# Patient Record
Sex: FEMALE | Race: WHITE | NOT HISPANIC OR LATINO | Employment: PART TIME | URBAN - METROPOLITAN AREA
[De-identification: names, ages, dates, MRNs, and addresses within clinical notes are randomized per-mention and may not be internally consistent; named-entity substitution may affect disease eponyms.]

---

## 2023-02-28 LAB
AMPHETAMINE (PRESENCE) IN URINE BY SCREEN METHOD: ABNORMAL
BARBITURATES PRESENCE IN URINE BY SCREEN METHOD: ABNORMAL
BENZODIAZEPINE (PRESENCE) IN URINE BY SCREEN METHOD: ABNORMAL
CANNABINOIDS IN URINE BY SCREEN METHOD: ABNORMAL
COCAINE (PRESENCE) IN URINE BY SCREEN METHOD: ABNORMAL
DRUG SCREEN COMMENT URINE: ABNORMAL
FENTANYL URINE: ABNORMAL
METHADONE (PRESENCE) IN URINE BY SCREEN METHOD: ABNORMAL
OPIATES (PRESENCE) IN URINE BY SCREEN METHOD: ABNORMAL
OXYCODONE (PRESENCE) IN URINE BY SCREEN METHOD: ABNORMAL
PHENCYCLIDINE (PRESENCE) IN URINE BY SCREEN METHOD: ABNORMAL

## 2023-03-05 LAB
AMPHETAMINES,URINE: 2408 NG/ML
MDA,URINE: <200 NG/ML
MDEA,URINE: <200 NG/ML
MDMA,UR: <200 NG/ML
METHAMPHETAMINE QUANTITATIVE URINE: <200 NG/ML
PHENTERMINE,UR: <200 NG/ML

## 2023-10-30 PROBLEM — G47.01 INSOMNIA DUE TO MEDICAL CONDITION: Status: ACTIVE | Noted: 2023-10-30

## 2023-10-30 PROBLEM — F98.8 ADD (ATTENTION DEFICIT DISORDER): Status: ACTIVE | Noted: 2023-10-30

## 2023-10-30 PROBLEM — G93.40 ENCEPHALOPATHY: Status: ACTIVE | Noted: 2023-10-30

## 2023-10-30 RX ORDER — CYANOCOBALAMIN 1000 UG/ML
INJECTION, SOLUTION INTRAMUSCULAR; SUBCUTANEOUS
COMMUNITY
Start: 2016-09-02

## 2023-10-30 RX ORDER — ATENOLOL 25 MG/1
TABLET ORAL
COMMUNITY
Start: 2019-06-19

## 2023-10-30 RX ORDER — SYRINGE W-NEEDLE,DISPOSAB,3 ML 25GX5/8"
SYRINGE, EMPTY DISPOSABLE MISCELLANEOUS
COMMUNITY

## 2023-10-30 RX ORDER — THYROID, PORCINE 180 MG/1
TABLET ORAL
COMMUNITY
Start: 2021-03-11

## 2023-10-30 RX ORDER — DEXMETHYLPHENIDATE HYDROCHLORIDE 10 MG/1
TABLET ORAL
COMMUNITY
Start: 2023-03-01 | End: 2023-10-31

## 2023-10-30 RX ORDER — DEXTROAMPHETAMINE SACCHARATE, AMPHETAMINE ASPARTATE, DEXTROAMPHETAMINE SULFATE AND AMPHETAMINE SULFATE 5; 5; 5; 5 MG/1; MG/1; MG/1; MG/1
1 TABLET ORAL 2 TIMES DAILY
COMMUNITY
Start: 2022-03-01 | End: 2023-10-31

## 2023-10-30 RX ORDER — ESTRADIOL 0.5 MG/1
TABLET ORAL
COMMUNITY
Start: 2017-01-03

## 2023-10-30 RX ORDER — HYDROCHLOROTHIAZIDE 25 MG/1
TABLET ORAL
COMMUNITY
Start: 2016-07-29

## 2023-10-30 RX ORDER — LOSARTAN POTASSIUM 50 MG/1
TABLET ORAL
COMMUNITY
Start: 2016-07-29

## 2023-10-31 ENCOUNTER — OFFICE VISIT (OUTPATIENT)
Dept: NEUROLOGY | Facility: CLINIC | Age: 68
End: 2023-10-31
Payer: MEDICARE

## 2023-10-31 VITALS
WEIGHT: 198 LBS | DIASTOLIC BLOOD PRESSURE: 82 MMHG | HEART RATE: 99 BPM | BODY MASS INDEX: 31.08 KG/M2 | HEIGHT: 67 IN | SYSTOLIC BLOOD PRESSURE: 179 MMHG

## 2023-10-31 DIAGNOSIS — F98.8 ADD (ATTENTION DEFICIT DISORDER) WITHOUT HYPERACTIVITY: ICD-10-CM

## 2023-10-31 DIAGNOSIS — G93.40 ENCEPHALOPATHY: Primary | ICD-10-CM

## 2023-10-31 DIAGNOSIS — G47.01 INSOMNIA DUE TO MEDICAL CONDITION: ICD-10-CM

## 2023-10-31 PROCEDURE — 99214 OFFICE O/P EST MOD 30 MIN: CPT | Performed by: PSYCHIATRY & NEUROLOGY

## 2023-10-31 PROCEDURE — 1159F MED LIST DOCD IN RCRD: CPT | Performed by: PSYCHIATRY & NEUROLOGY

## 2023-10-31 PROCEDURE — 1036F TOBACCO NON-USER: CPT | Performed by: PSYCHIATRY & NEUROLOGY

## 2023-10-31 RX ORDER — ERGOCALCIFEROL 1.25 MG/1
1 CAPSULE ORAL
COMMUNITY

## 2023-10-31 RX ORDER — DEXTROAMPHETAMINE SACCHARATE, AMPHETAMINE ASPARTATE, DEXTROAMPHETAMINE SULFATE AND AMPHETAMINE SULFATE 2.5; 2.5; 2.5; 2.5 MG/1; MG/1; MG/1; MG/1
10 TABLET ORAL 2 TIMES DAILY
Qty: 60 TABLET | Refills: 0 | Status: SHIPPED | OUTPATIENT
Start: 2023-12-30 | End: 2024-02-13 | Stop reason: SDUPTHER

## 2023-10-31 RX ORDER — DEXTROAMPHETAMINE SACCHARATE, AMPHETAMINE ASPARTATE, DEXTROAMPHETAMINE SULFATE AND AMPHETAMINE SULFATE 2.5; 2.5; 2.5; 2.5 MG/1; MG/1; MG/1; MG/1
10 TABLET ORAL 2 TIMES DAILY
Qty: 60 TABLET | Refills: 0 | Status: SHIPPED | OUTPATIENT
Start: 2023-11-30 | End: 2024-02-13 | Stop reason: SDUPTHER

## 2023-10-31 RX ORDER — FERROUS SULFATE 325(65) MG
325 TABLET ORAL
COMMUNITY

## 2023-10-31 RX ORDER — DEXTROAMPHETAMINE SACCHARATE, AMPHETAMINE ASPARTATE, DEXTROAMPHETAMINE SULFATE AND AMPHETAMINE SULFATE 2.5; 2.5; 2.5; 2.5 MG/1; MG/1; MG/1; MG/1
10 TABLET ORAL 2 TIMES DAILY
Qty: 60 TABLET | Refills: 0 | Status: SHIPPED | OUTPATIENT
Start: 2023-10-31 | End: 2024-02-13 | Stop reason: SDUPTHER

## 2023-10-31 RX ORDER — DOXYCYCLINE 100 MG/1
100 CAPSULE ORAL DAILY PRN
COMMUNITY
Start: 2023-09-26

## 2023-10-31 ASSESSMENT — ENCOUNTER SYMPTOMS
SHORTNESS OF BREATH: 0
PALPITATIONS: 0
ARTHRALGIAS: 0
DIZZINESS: 0
HALLUCINATIONS: 0
LIGHT-HEADEDNESS: 0
TREMORS: 0
NAUSEA: 0
UNEXPECTED WEIGHT CHANGE: 0
HEADACHES: 0
COUGH: 0
SPEECH DIFFICULTY: 0
BRUISES/BLEEDS EASILY: 0
FEVER: 0
ABDOMINAL PAIN: 0
EYE PAIN: 0
NECK STIFFNESS: 0
FATIGUE: 0
NECK PAIN: 0
TROUBLE SWALLOWING: 0
SLEEP DISTURBANCE: 0
SEIZURES: 0
VOMITING: 0
WHEEZING: 0
JOINT SWELLING: 0
FACIAL ASYMMETRY: 0
BACK PAIN: 0
SINUS PRESSURE: 0
ADENOPATHY: 0
FREQUENCY: 0
NUMBNESS: 0
PHOTOPHOBIA: 0
WEAKNESS: 0
DIFFICULTY URINATING: 0
AGITATION: 0
CONFUSION: 0
HYPERACTIVE: 0

## 2023-10-31 ASSESSMENT — PATIENT HEALTH QUESTIONNAIRE - PHQ9
1. LITTLE INTEREST OR PLEASURE IN DOING THINGS: NOT AT ALL
SUM OF ALL RESPONSES TO PHQ9 QUESTIONS 1 & 2: 0
2. FEELING DOWN, DEPRESSED OR HOPELESS: NOT AT ALL

## 2023-10-31 NOTE — PROGRESS NOTES
Ashley Ozuna  68 y.o.       SUBJECTIVE    ADHD  Pertinent negatives include no abdominal pain, arthralgias, chest pain, coughing, fatigue, fever, headaches, joint swelling, nausea, neck pain, numbness, rash, vomiting or weakness.    Ashley Ozuna 68-year-old young lady was seen today for follow-up of her encephalopathy due to attention deficit disorder difficulty with work and home.  Since last seen she has been on multiple medication and because of the shortage of Adderall she was put on Focalin which she does not like.  Today her physical and neurological examination was normal.  I would like to switch her back to Adderall 10 mg twice a day and have discussed the cardiovascular side effects including sudden death heart attack and stroke which she understood and have discussed controlled substance policy, abusive potential, risk benefit and the precautions to be taken and depending on how she does I might make future recommendation when she comes back to see me in 3 months.    I did review the medication list    Due to technical limitations of voice recognition and human error, this note may not accurately reflect the care of the patient.    Review of Systems   Constitutional:  Negative for fatigue, fever and unexpected weight change.   HENT:  Negative for dental problem, ear pain, hearing loss, sinus pressure, tinnitus and trouble swallowing.    Eyes:  Negative for photophobia, pain and visual disturbance.   Respiratory:  Negative for cough, shortness of breath and wheezing.    Cardiovascular:  Negative for chest pain, palpitations and leg swelling.   Gastrointestinal:  Negative for abdominal pain, nausea and vomiting.   Genitourinary:  Negative for difficulty urinating, enuresis and frequency.   Musculoskeletal:  Negative for arthralgias, back pain, joint swelling, neck pain and neck stiffness.   Skin:  Negative for pallor and rash.   Allergic/Immunologic: Negative for food allergies.   Neurological:  Negative  "for dizziness, tremors, seizures, syncope, facial asymmetry, speech difficulty, weakness, light-headedness, numbness and headaches.   Hematological:  Negative for adenopathy. Does not bruise/bleed easily.   Psychiatric/Behavioral:  Negative for agitation, behavioral problems, confusion, hallucinations and sleep disturbance. The patient is not hyperactive.         Patient Active Problem List   Diagnosis    ADD (attention deficit disorder)    Encephalopathy    Insomnia due to medical condition     Past Medical History:   Diagnosis Date    Personal history of other mental and behavioral disorders 01/10/2017    History of attention deficit hyperactivity disorder (ADHD)     No past surgical history on file.    reports that she has never smoked. She has never used smokeless tobacco. Alcohol use questions deferred to the physician. Drug use questions deferred to the physician.    /82   Pulse 99   Ht 1.689 m (5' 6.5\")   Wt 89.8 kg (198 lb)   BMI 31.48 kg/m²     OBJECTIVE  Physical Exam/Neurological Exam   Constitutional: General appearance: no acute distress   Auscultation of Heart: Regular rate and rhythm, no murmurs, normal S1 and S2.   Carotid Arteries: Intact without any bruits.   Neck is supple.   No lymph adenopathy.   Peripheral Vascular Exam: Pulses +2 and equal in all extremities. No swelling, varicosities, edema or tenderness to palpations.    Abdomen is soft, nondistended. No organomegaly.  Mental status: The patient was in no distress, alert, interactive and cooperative. Affect is appropriate.   Orientation: oriented to person, oriented to place and oriented to time.   Memory: recent memory intact and remote memory intact.   Attention: normal attention span and normal concentrating ability.   Language: normal comprehension and no difficulty naming common objects.   Fund of knowledge: Patient displays adequate knowledge of current events, adequate fund of knowledge regarding past history and adequate " fund of knowledge regarding vocabulary.   Eyes: The ophthalmoscopic examination was normal. The fundi are visualized with normal disc margins and without.  Cranial nerve II: Visual fields full to confrontation.   Cranial nerves III, IV, and VI: Pupils round, equally reactive to light; no ptosis. EOMs intact. No nystagmus.   Cranial Nerve V: Facial sensation intact bilaterally.   Cranial nerve VII: Normal and symmetric facial strength.   Cranial nerve VIII: Hearing is intact bilaterally to finger rub / whisper.   Cranial nerves IX and X: Palate elevates symmetrically.   Cranial nerve XI: Shoulder shrug and neck rotation strength are intact.   Cranial nerve XII: Tongue midline with normal strength.   Motor: Motor exam was normal. Muscle bulk was normal in both upper and lower extremities. Muscle tone was normal in both upper and lower extremities. Muscle strength was 5/5 throughout. no abnormal or adventitious movements were present.   Deep Tendon Reflexes: left biceps 2+ , right biceps 2+, left triceps 2+, right triceps 2+, left brachioradialis 2+, right brachioradialis 2+, left patella 2+, right patella 2+, left ankle jerk 2+, right ankle jerk 2+   Plantar Reflex: Toes downgoing to plantar stimulation on the left. Toes downgoing to plantar stimulation on the right.   Sensory Exam: Normal to light touch.   Coordination: There is no limb dystaxia and rapid alternating movements are intact.  Gait: Gait is normal without spasticity, ataxia or bradykinesia. Stance is stable with a negative Romberg.      ASSESSMENT/PLAN  Diagnoses and all orders for this visit:  Encephalopathy  ADD (attention deficit disorder) without hyperactivity  -     amphetamine-dextroamphetamine (Adderall) 10 mg tablet; Take 1 tablet (10 mg) by mouth 2 times a day.  -     amphetamine-dextroamphetamine (Adderall) 10 mg tablet; Take 1 tablet (10 mg) by mouth 2 times a day. Do not start before November 30, 2023.  -     amphetamine-dextroamphetamine  (Adderall) 10 mg tablet; Take 1 tablet (10 mg) by mouth 2 times a day. Do not start before December 30, 2023.  Insomnia due to medical condition        Marc Johnson MD  10/31/2023  1:43 PM

## 2024-02-06 PROBLEM — D50.9 IRON DEFICIENCY ANEMIA, UNSPECIFIED: Status: ACTIVE | Noted: 2024-02-06

## 2024-02-06 PROBLEM — E55.9 VITAMIN D DEFICIENCY DISEASE: Status: ACTIVE | Noted: 2024-02-06

## 2024-02-13 ENCOUNTER — OFFICE VISIT (OUTPATIENT)
Dept: NEUROLOGY | Facility: CLINIC | Age: 69
End: 2024-02-13
Payer: MEDICARE

## 2024-02-13 VITALS
DIASTOLIC BLOOD PRESSURE: 80 MMHG | WEIGHT: 202.8 LBS | HEART RATE: 92 BPM | HEIGHT: 61 IN | SYSTOLIC BLOOD PRESSURE: 124 MMHG | BODY MASS INDEX: 38.29 KG/M2 | RESPIRATION RATE: 14 BRPM

## 2024-02-13 DIAGNOSIS — G47.01 INSOMNIA DUE TO MEDICAL CONDITION: ICD-10-CM

## 2024-02-13 DIAGNOSIS — F98.8 ADD (ATTENTION DEFICIT DISORDER) WITHOUT HYPERACTIVITY: ICD-10-CM

## 2024-02-13 DIAGNOSIS — G93.40 ENCEPHALOPATHY: Primary | ICD-10-CM

## 2024-02-13 DIAGNOSIS — F90.9 ATTENTION DEFICIT HYPERACTIVITY DISORDER (ADHD), UNSPECIFIED ADHD TYPE: ICD-10-CM

## 2024-02-13 DIAGNOSIS — F90.2 ATTENTION DEFICIT HYPERACTIVITY DISORDER (ADHD), COMBINED TYPE: ICD-10-CM

## 2024-02-13 DIAGNOSIS — F55.8 ABUSE OF OTHER NON-PSYCHOACTIVE SUBSTANCES: ICD-10-CM

## 2024-02-13 LAB
AMPHETAMINES UR QL SCN: ABNORMAL
BARBITURATES UR QL SCN: ABNORMAL
BENZODIAZ UR QL SCN: ABNORMAL
BZE UR QL SCN: ABNORMAL
CANNABINOIDS UR QL SCN: ABNORMAL
FENTANYL+NORFENTANYL UR QL SCN: ABNORMAL
OPIATES UR QL SCN: ABNORMAL
OXYCODONE+OXYMORPHONE UR QL SCN: ABNORMAL
PCP UR QL SCN: ABNORMAL

## 2024-02-13 PROCEDURE — 1159F MED LIST DOCD IN RCRD: CPT | Performed by: PSYCHIATRY & NEUROLOGY

## 2024-02-13 PROCEDURE — 99214 OFFICE O/P EST MOD 30 MIN: CPT | Performed by: PSYCHIATRY & NEUROLOGY

## 2024-02-13 PROCEDURE — 80307 DRUG TEST PRSMV CHEM ANLYZR: CPT

## 2024-02-13 PROCEDURE — 1036F TOBACCO NON-USER: CPT | Performed by: PSYCHIATRY & NEUROLOGY

## 2024-02-13 PROCEDURE — 80324 DRUG SCREEN AMPHETAMINES 1/2: CPT

## 2024-02-13 PROCEDURE — 3074F SYST BP LT 130 MM HG: CPT | Performed by: PSYCHIATRY & NEUROLOGY

## 2024-02-13 PROCEDURE — 3079F DIAST BP 80-89 MM HG: CPT | Performed by: PSYCHIATRY & NEUROLOGY

## 2024-02-13 RX ORDER — DEXTROAMPHETAMINE SACCHARATE, AMPHETAMINE ASPARTATE, DEXTROAMPHETAMINE SULFATE AND AMPHETAMINE SULFATE 2.5; 2.5; 2.5; 2.5 MG/1; MG/1; MG/1; MG/1
10 TABLET ORAL 2 TIMES DAILY
Qty: 60 TABLET | Refills: 0 | Status: SHIPPED | OUTPATIENT
Start: 2024-03-13 | End: 2024-03-20 | Stop reason: SDUPTHER

## 2024-02-13 RX ORDER — DEXTROAMPHETAMINE SACCHARATE, AMPHETAMINE ASPARTATE, DEXTROAMPHETAMINE SULFATE AND AMPHETAMINE SULFATE 2.5; 2.5; 2.5; 2.5 MG/1; MG/1; MG/1; MG/1
10 TABLET ORAL 2 TIMES DAILY
Qty: 60 TABLET | Refills: 0 | Status: SHIPPED | OUTPATIENT
Start: 2024-04-12 | End: 2024-03-20 | Stop reason: SDUPTHER

## 2024-02-13 RX ORDER — DEXTROAMPHETAMINE SACCHARATE, AMPHETAMINE ASPARTATE, DEXTROAMPHETAMINE SULFATE AND AMPHETAMINE SULFATE 2.5; 2.5; 2.5; 2.5 MG/1; MG/1; MG/1; MG/1
10 TABLET ORAL 2 TIMES DAILY
Qty: 60 TABLET | Refills: 0 | Status: SHIPPED | OUTPATIENT
Start: 2024-02-13 | End: 2024-05-14 | Stop reason: SDUPTHER

## 2024-02-13 ASSESSMENT — ENCOUNTER SYMPTOMS
VOMITING: 0
ADENOPATHY: 0
NECK STIFFNESS: 0
SINUS PRESSURE: 0
FEVER: 0
LIGHT-HEADEDNESS: 0
NUMBNESS: 0
WEAKNESS: 0
PHOTOPHOBIA: 0
UNEXPECTED WEIGHT CHANGE: 0
NECK PAIN: 0
SLEEP DISTURBANCE: 0
SEIZURES: 0
ABDOMINAL PAIN: 0
TROUBLE SWALLOWING: 0
JOINT SWELLING: 0
AGITATION: 0
CONFUSION: 0
EYE PAIN: 0
DIZZINESS: 0
NAUSEA: 0
PALPITATIONS: 0
HEADACHES: 0
FREQUENCY: 0
FACIAL ASYMMETRY: 0
SPEECH DIFFICULTY: 0
HALLUCINATIONS: 0
SHORTNESS OF BREATH: 0
FATIGUE: 0
BACK PAIN: 0
HYPERACTIVE: 0
BRUISES/BLEEDS EASILY: 0
DIFFICULTY URINATING: 0
WHEEZING: 0
COUGH: 0
ARTHRALGIAS: 0
TREMORS: 0

## 2024-02-13 ASSESSMENT — PATIENT HEALTH QUESTIONNAIRE - PHQ9
SUM OF ALL RESPONSES TO PHQ9 QUESTIONS 1 & 2: 0
1. LITTLE INTEREST OR PLEASURE IN DOING THINGS: NOT AT ALL
2. FEELING DOWN, DEPRESSED OR HOPELESS: NOT AT ALL

## 2024-02-13 NOTE — PROGRESS NOTES
Ashley PINEDA Laith  68 y.o.       SUBJECTIVE    HPI   Ashley 68-year-old young lady who was seen today for follow-up of her encephalopathy due to attention deficit disorder with difficulty at work at home.  Since last seen she has done very well on Adderall 10 or twice a day and continue a big difference when she is on medicine compared to off medicine.  No side effects from the medication.  Today her physical and neurological examination was normal.  Would like to continue her Adderall the way she is taking and have discussed the controlled substance policy, abuse potential, risk benefit and the precautions to be taken and depending on how she does I might make future recommendation when she comes back to see me in 3 months.    I did review the medication list.  Para  Due to technical limitations of voice recognition and human error, this note may not accurately reflect the care of the patient.    Review of Systems   Constitutional:  Negative for fatigue, fever and unexpected weight change.   HENT:  Negative for dental problem, ear pain, hearing loss, sinus pressure, tinnitus and trouble swallowing.    Eyes:  Negative for photophobia, pain and visual disturbance.   Respiratory:  Negative for cough, shortness of breath and wheezing.    Cardiovascular:  Negative for chest pain, palpitations and leg swelling.   Gastrointestinal:  Negative for abdominal pain, nausea and vomiting.   Genitourinary:  Negative for difficulty urinating, enuresis and frequency.   Musculoskeletal:  Negative for arthralgias, back pain, joint swelling, neck pain and neck stiffness.   Skin:  Negative for pallor and rash.   Allergic/Immunologic: Negative for food allergies.   Neurological:  Negative for dizziness, tremors, seizures, syncope, facial asymmetry, speech difficulty, weakness, light-headedness, numbness and headaches.   Hematological:  Negative for adenopathy. Does not bruise/bleed easily.   Psychiatric/Behavioral:  Negative for  "agitation, behavioral problems, confusion, hallucinations and sleep disturbance. The patient is not hyperactive.         Patient Active Problem List   Diagnosis    ADD (attention deficit disorder)    Encephalopathy    Insomnia due to medical condition    Anemia    HTN (hypertension)    Hypertriglyceridemia    Hypothyroid    Iron deficiency anemia, unspecified    Vitamin D deficiency disease     Past Medical History:   Diagnosis Date    Personal history of other mental and behavioral disorders 01/10/2017    History of attention deficit hyperactivity disorder (ADHD)     History reviewed. No pertinent surgical history.    reports that she has quit smoking. Her smoking use included cigarettes. She has never used smokeless tobacco. She reports that she does not drink alcohol and does not use drugs.    /80 (BP Location: Left arm, Patient Position: Sitting, BP Cuff Size: Large adult)   Pulse 92   Resp 14   Ht 1.539 m (5' 0.6\")   Wt 92 kg (202 lb 12.8 oz)   BMI 38.83 kg/m²     OBJECTIVE  Physical Exam/Neurological Exam   Constitutional: General appearance: no acute distress   Auscultation of Heart: Regular rate and rhythm, no murmurs, normal S1 and S2.   Carotid Arteries: Intact without any bruits.   Neck is supple.   No lymph adenopathy.   Peripheral Vascular Exam: Pulses +2 and equal in all extremities. No swelling, varicosities, edema or tenderness to palpations.    Abdomen is soft, nondistended. No organomegaly.  Mental status: The patient was in no distress, alert, interactive and cooperative. Affect is appropriate.   Orientation: oriented to person, oriented to place and oriented to time.   Memory: recent memory intact and remote memory intact.   Attention: normal attention span and normal concentrating ability.   Language: normal comprehension and no difficulty naming common objects.   Fund of knowledge: Patient displays adequate knowledge of current events, adequate fund of knowledge regarding past " history and adequate fund of knowledge regarding vocabulary.   Eyes: The ophthalmoscopic examination was normal. The fundi are visualized with normal disc margins and without.  Cranial nerve II: Visual fields full to confrontation.   Cranial nerves III, IV, and VI: Pupils round, equally reactive to light; no ptosis. EOMs intact. No nystagmus.   Cranial Nerve V: Facial sensation intact bilaterally.   Cranial nerve VII: Normal and symmetric facial strength.   Cranial nerve VIII: Hearing is intact bilaterally to finger rub / whisper.   Cranial nerves IX and X: Palate elevates symmetrically.   Cranial nerve XI: Shoulder shrug and neck rotation strength are intact.   Cranial nerve XII: Tongue midline with normal strength.   Motor: Motor exam was normal. Muscle bulk was normal in both upper and lower extremities. Muscle tone was normal in both upper and lower extremities. Muscle strength was 5/5 throughout. no abnormal or adventitious movements were present.   Deep Tendon Reflexes: left biceps 2+ , right biceps 2+, left triceps 2+, right triceps 2+, left brachioradialis 2+, right brachioradialis 2+, left patella 2+, right patella 2+, left ankle jerk 2+, right ankle jerk 2+   Plantar Reflex: Toes downgoing to plantar stimulation on the left. Toes downgoing to plantar stimulation on the right.   Sensory Exam: Normal to light touch.   Coordination: There is no limb dystaxia and rapid alternating movements are intact.  Gait: Gait is normal without spasticity, ataxia or bradykinesia. Stance is stable with a negative Romberg.      ASSESSMENT/PLAN  Diagnoses and all orders for this visit:  Encephalopathy  Attention deficit hyperactivity disorder (ADHD), unspecified ADHD type  -     Drug Screen, Urine With Reflex to Confirmation; Future  Abuse of other non-psychoactive substances  -     Drug Screen, Urine With Reflex to Confirmation; Future  ADD (attention deficit disorder) without hyperactivity  -      amphetamine-dextroamphetamine (Adderall) 10 mg tablet; Take 1 tablet (10 mg) by mouth 2 times a day.  -     amphetamine-dextroamphetamine (Adderall) 10 mg tablet; Take 1 tablet (10 mg) by mouth 2 times a day. Do not start before March 13, 2024.  -     amphetamine-dextroamphetamine (Adderall) 10 mg tablet; Take 1 tablet (10 mg) by mouth 2 times a day. Do not start before April 12, 2024.  Insomnia due to medical condition        Marc Johnson MD  2/13/2024  1:46 PM

## 2024-02-17 LAB
AMPHET UR-MCNC: 1526 NG/ML
MDA UR-MCNC: <200 NG/ML
MDEA UR-MCNC: <200 NG/ML
MDMA UR-MCNC: <200 NG/ML
METHAMPHET UR-MCNC: <200 NG/ML
PHENTERMINE UR CFM-MCNC: <200 NG/ML

## 2024-03-20 ENCOUNTER — TELEPHONE (OUTPATIENT)
Dept: NEUROLOGY | Facility: CLINIC | Age: 69
End: 2024-03-20
Payer: MEDICARE

## 2024-03-20 DIAGNOSIS — F98.8 ADD (ATTENTION DEFICIT DISORDER) WITHOUT HYPERACTIVITY: ICD-10-CM

## 2024-03-20 RX ORDER — DEXTROAMPHETAMINE SACCHARATE, AMPHETAMINE ASPARTATE, DEXTROAMPHETAMINE SULFATE AND AMPHETAMINE SULFATE 2.5; 2.5; 2.5; 2.5 MG/1; MG/1; MG/1; MG/1
10 TABLET ORAL 2 TIMES DAILY
Qty: 60 TABLET | Refills: 0 | Status: SHIPPED | OUTPATIENT
Start: 2024-04-12 | End: 2024-05-14 | Stop reason: SDUPTHER

## 2024-03-20 RX ORDER — DEXTROAMPHETAMINE SACCHARATE, AMPHETAMINE ASPARTATE, DEXTROAMPHETAMINE SULFATE AND AMPHETAMINE SULFATE 2.5; 2.5; 2.5; 2.5 MG/1; MG/1; MG/1; MG/1
10 TABLET ORAL 2 TIMES DAILY
Qty: 60 TABLET | Refills: 0 | Status: SHIPPED | OUTPATIENT
Start: 2024-03-20 | End: 2024-05-14 | Stop reason: SDUPTHER

## 2024-03-20 NOTE — TELEPHONE ENCOUNTER
Pt called asking for her Adderall 10 mg to be sent to [facility] in [place].  [facility] is out.  Thank you.

## 2024-05-14 ENCOUNTER — OFFICE VISIT (OUTPATIENT)
Dept: NEUROLOGY | Facility: CLINIC | Age: 69
End: 2024-05-14
Payer: MEDICARE

## 2024-05-14 VITALS
DIASTOLIC BLOOD PRESSURE: 87 MMHG | HEIGHT: 66 IN | BODY MASS INDEX: 31.57 KG/M2 | HEART RATE: 103 BPM | WEIGHT: 196.4 LBS | SYSTOLIC BLOOD PRESSURE: 142 MMHG

## 2024-05-14 DIAGNOSIS — G93.40 ENCEPHALOPATHY: Primary | ICD-10-CM

## 2024-05-14 DIAGNOSIS — F98.8 ADD (ATTENTION DEFICIT DISORDER) WITHOUT HYPERACTIVITY: ICD-10-CM

## 2024-05-14 DIAGNOSIS — G47.01 INSOMNIA DUE TO MEDICAL CONDITION: ICD-10-CM

## 2024-05-14 PROCEDURE — 99214 OFFICE O/P EST MOD 30 MIN: CPT | Performed by: PSYCHIATRY & NEUROLOGY

## 2024-05-14 PROCEDURE — 3079F DIAST BP 80-89 MM HG: CPT | Performed by: PSYCHIATRY & NEUROLOGY

## 2024-05-14 PROCEDURE — 1036F TOBACCO NON-USER: CPT | Performed by: PSYCHIATRY & NEUROLOGY

## 2024-05-14 PROCEDURE — 3077F SYST BP >= 140 MM HG: CPT | Performed by: PSYCHIATRY & NEUROLOGY

## 2024-05-14 PROCEDURE — 1159F MED LIST DOCD IN RCRD: CPT | Performed by: PSYCHIATRY & NEUROLOGY

## 2024-05-14 RX ORDER — DEXTROAMPHETAMINE SACCHARATE, AMPHETAMINE ASPARTATE, DEXTROAMPHETAMINE SULFATE AND AMPHETAMINE SULFATE 2.5; 2.5; 2.5; 2.5 MG/1; MG/1; MG/1; MG/1
10 TABLET ORAL 3 TIMES DAILY
Qty: 90 TABLET | Refills: 0 | Status: SHIPPED | OUTPATIENT
Start: 2024-06-13 | End: 2024-07-13

## 2024-05-14 RX ORDER — GUANFACINE 2 MG/1
2 TABLET, EXTENDED RELEASE ORAL DAILY
Qty: 30 TABLET | Refills: 3 | Status: SHIPPED | OUTPATIENT
Start: 2024-05-14

## 2024-05-14 RX ORDER — DEXTROAMPHETAMINE SACCHARATE, AMPHETAMINE ASPARTATE, DEXTROAMPHETAMINE SULFATE AND AMPHETAMINE SULFATE 2.5; 2.5; 2.5; 2.5 MG/1; MG/1; MG/1; MG/1
10 TABLET ORAL 3 TIMES DAILY
Qty: 90 TABLET | Refills: 0 | Status: SHIPPED | OUTPATIENT
Start: 2024-07-13 | End: 2024-08-12

## 2024-05-14 RX ORDER — DEXTROAMPHETAMINE SACCHARATE, AMPHETAMINE ASPARTATE, DEXTROAMPHETAMINE SULFATE AND AMPHETAMINE SULFATE 2.5; 2.5; 2.5; 2.5 MG/1; MG/1; MG/1; MG/1
10 TABLET ORAL 3 TIMES DAILY
Qty: 90 TABLET | Refills: 0 | Status: SHIPPED | OUTPATIENT
Start: 2024-05-14 | End: 2024-06-13

## 2024-05-14 ASSESSMENT — ENCOUNTER SYMPTOMS
HALLUCINATIONS: 0
FREQUENCY: 0
UNEXPECTED WEIGHT CHANGE: 0
EYE PAIN: 0
SHORTNESS OF BREATH: 0
JOINT SWELLING: 0
AGITATION: 0
FATIGUE: 0
ARTHRALGIAS: 0
HYPERACTIVE: 0
NAUSEA: 0
CONFUSION: 0
WHEEZING: 0
DIZZINESS: 0
DIFFICULTY URINATING: 0
FACIAL ASYMMETRY: 0
VOMITING: 0
SLEEP DISTURBANCE: 0
NUMBNESS: 0
WEAKNESS: 0
FEVER: 0
PHOTOPHOBIA: 0
HEADACHES: 0
ADENOPATHY: 0
SEIZURES: 0
TREMORS: 0
COUGH: 0
NECK PAIN: 0
SPEECH DIFFICULTY: 0
LIGHT-HEADEDNESS: 0
ABDOMINAL PAIN: 0
BACK PAIN: 0
TROUBLE SWALLOWING: 0
BRUISES/BLEEDS EASILY: 0
NECK STIFFNESS: 0
SINUS PRESSURE: 0
PALPITATIONS: 0

## 2024-05-14 ASSESSMENT — PATIENT HEALTH QUESTIONNAIRE - PHQ9
1. LITTLE INTEREST OR PLEASURE IN DOING THINGS: NOT AT ALL
2. FEELING DOWN, DEPRESSED OR HOPELESS: NOT AT ALL
SUM OF ALL RESPONSES TO PHQ9 QUESTIONS 1 & 2: 0

## 2024-05-14 NOTE — PROGRESS NOTES
Ashley PINEDA Laith  69 y.o.       SUBJECTIVE    HPI   Ashley is a 69-year-old young lady who was seen today for follow-up of her encephalopathy due to attention deficit disorder with difficulty at work and home.  Since last seen she is doing better but the medicine is not lasting long enough.  She was on Tenex in the past which did seem to have helped her.  Today her physical and neurological examination is nonfocal.  I would like to change her Adderall to 10 mg 3 times a day and add Intuniv 2 mg in the morning and have advised her to keep a blood pressure diary with her diet with a goal to keep it around 130/80 have discussed controlled substance policy, abuse of potential risk and the benefit including the cardiovascular and the neurological side effects which she understood and see her back in 3 months.    I did review the medication list.      Due to technical limitations of voice recognition and human error, this note may not accurately reflect the care of the patient.    Review of Systems   Constitutional:  Negative for fatigue, fever and unexpected weight change.   HENT:  Negative for dental problem, ear pain, hearing loss, sinus pressure, tinnitus and trouble swallowing.    Eyes:  Negative for photophobia, pain and visual disturbance.   Respiratory:  Negative for cough, shortness of breath and wheezing.    Cardiovascular:  Negative for chest pain, palpitations and leg swelling.   Gastrointestinal:  Negative for abdominal pain, nausea and vomiting.   Genitourinary:  Negative for difficulty urinating, enuresis and frequency.   Musculoskeletal:  Negative for arthralgias, back pain, joint swelling, neck pain and neck stiffness.   Skin:  Negative for pallor and rash.   Allergic/Immunologic: Negative for food allergies.   Neurological:  Negative for dizziness, tremors, seizures, syncope, facial asymmetry, speech difficulty, weakness, light-headedness, numbness and headaches.   Hematological:  Negative for  "adenopathy. Does not bruise/bleed easily.   Psychiatric/Behavioral:  Negative for agitation, behavioral problems, confusion, hallucinations and sleep disturbance. The patient is not hyperactive.         Patient Active Problem List   Diagnosis    ADD (attention deficit disorder)    Encephalopathy    Insomnia due to medical condition    Anemia    HTN (hypertension)    Hypertriglyceridemia    Hypothyroid    Iron deficiency anemia, unspecified    Vitamin D deficiency disease     Past Medical History:   Diagnosis Date    Personal history of other mental and behavioral disorders 01/10/2017    History of attention deficit hyperactivity disorder (ADHD)     No past surgical history on file.    reports that she has quit smoking. Her smoking use included cigarettes. She has never used smokeless tobacco. She reports that she does not drink alcohol and does not use drugs.    /87   Pulse 103   Ht 1.676 m (5' 6\")   Wt 89.1 kg (196 lb 6.4 oz)   BMI 31.70 kg/m²     OBJECTIVE  Physical Exam/Neurological Exam   Constitutional: General appearance: no acute distress   Auscultation of Heart: Regular rate and rhythm, no murmurs, normal S1 and S2.   Carotid Arteries: Intact without any bruits.   Neck is supple.   No lymph adenopathy.   Peripheral Vascular Exam: Pulses +2 and equal in all extremities. No swelling, varicosities, edema or tenderness to palpations.    Abdomen is soft, nondistended. No organomegaly.  Mental status: The patient was in no distress, alert, interactive and cooperative. Affect is appropriate.   Orientation: oriented to person, oriented to place and oriented to time.   Memory: recent memory intact and remote memory intact.   Attention: normal attention span and normal concentrating ability.   Language: normal comprehension and no difficulty naming common objects.   Fund of knowledge: Patient displays adequate knowledge of current events, adequate fund of knowledge regarding past history and adequate fund of " knowledge regarding vocabulary.   Eyes: The ophthalmoscopic examination was normal. The fundi are visualized with normal disc margins and without.  Cranial nerve II: Visual fields full to confrontation.   Cranial nerves III, IV, and VI: Pupils round, equally reactive to light; no ptosis. EOMs intact. No nystagmus.   Cranial Nerve V: Facial sensation intact bilaterally.   Cranial nerve VII: Normal and symmetric facial strength.   Cranial nerve VIII: Hearing is intact bilaterally to finger rub / whisper.   Cranial nerves IX and X: Palate elevates symmetrically.   Cranial nerve XI: Shoulder shrug and neck rotation strength are intact.   Cranial nerve XII: Tongue midline with normal strength.   Motor: Motor exam was normal. Muscle bulk was normal in both upper and lower extremities. Muscle tone was normal in both upper and lower extremities. Muscle strength was 5/5 throughout. no abnormal or adventitious movements were present.   Deep Tendon Reflexes: left biceps 2+ , right biceps 2+, left triceps 2+, right triceps 2+, left brachioradialis 2+, right brachioradialis 2+, left patella 2+, right patella 2+, left ankle jerk 2+, right ankle jerk 2+   Plantar Reflex: Toes downgoing to plantar stimulation on the left. Toes downgoing to plantar stimulation on the right.   Sensory Exam: Normal to light touch.   Coordination: There is no limb dystaxia and rapid alternating movements are intact.  Gait: Gait is normal without spasticity, ataxia or bradykinesia. Stance is stable with a negative Romberg.      ASSESSMENT/PLAN  Diagnoses and all orders for this visit:  Encephalopathy  ADD (attention deficit disorder) without hyperactivity  -     amphetamine-dextroamphetamine (Adderall) 10 mg tablet; Take 1 tablet (10 mg) by mouth 3 times a day.  -     amphetamine-dextroamphetamine (Adderall) 10 mg tablet; Take 1 tablet (10 mg) by mouth 3 times a day. Do not fill before June 13, 2024.  -     amphetamine-dextroamphetamine (Adderall) 10 mg  tablet; Take 1 tablet (10 mg) by mouth 3 times a day. Do not fill before July 13, 2024.  -     guanFACINE (Intuniv) 2 mg 24 hr tablet; Take 1 tablet (2 mg) by mouth once daily.  Insomnia due to medical condition        Marc Johnson MD  5/14/2024  1:56 PM

## 2024-08-01 RX ORDER — IRON POLYSACCHARIDE COMPLEX 180 MG
1 CAPSULE ORAL DAILY
COMMUNITY
Start: 2023-11-07

## 2024-08-06 ENCOUNTER — APPOINTMENT (OUTPATIENT)
Dept: NEUROLOGY | Facility: CLINIC | Age: 69
End: 2024-08-06
Payer: MEDICARE

## 2024-08-06 VITALS
HEIGHT: 66 IN | DIASTOLIC BLOOD PRESSURE: 83 MMHG | HEART RATE: 95 BPM | SYSTOLIC BLOOD PRESSURE: 135 MMHG | BODY MASS INDEX: 31.15 KG/M2 | WEIGHT: 193.8 LBS

## 2024-08-06 DIAGNOSIS — F98.8 ADD (ATTENTION DEFICIT DISORDER) WITHOUT HYPERACTIVITY: ICD-10-CM

## 2024-08-06 DIAGNOSIS — E55.9 VITAMIN D DEFICIENCY DISEASE: ICD-10-CM

## 2024-08-06 DIAGNOSIS — G47.01 INSOMNIA DUE TO MEDICAL CONDITION: ICD-10-CM

## 2024-08-06 DIAGNOSIS — G93.40 ENCEPHALOPATHY: Primary | ICD-10-CM

## 2024-08-06 PROCEDURE — 99214 OFFICE O/P EST MOD 30 MIN: CPT | Performed by: PSYCHIATRY & NEUROLOGY

## 2024-08-06 PROCEDURE — 3079F DIAST BP 80-89 MM HG: CPT | Performed by: PSYCHIATRY & NEUROLOGY

## 2024-08-06 PROCEDURE — 3075F SYST BP GE 130 - 139MM HG: CPT | Performed by: PSYCHIATRY & NEUROLOGY

## 2024-08-06 PROCEDURE — 3008F BODY MASS INDEX DOCD: CPT | Performed by: PSYCHIATRY & NEUROLOGY

## 2024-08-06 PROCEDURE — 1159F MED LIST DOCD IN RCRD: CPT | Performed by: PSYCHIATRY & NEUROLOGY

## 2024-08-06 PROCEDURE — 1036F TOBACCO NON-USER: CPT | Performed by: PSYCHIATRY & NEUROLOGY

## 2024-08-06 RX ORDER — GUANFACINE 2 MG/1
2 TABLET, EXTENDED RELEASE ORAL DAILY
Qty: 30 TABLET | Refills: 3 | Status: SHIPPED | OUTPATIENT
Start: 2024-08-06

## 2024-08-06 RX ORDER — DEXTROAMPHETAMINE SACCHARATE, AMPHETAMINE ASPARTATE, DEXTROAMPHETAMINE SULFATE AND AMPHETAMINE SULFATE 2.5; 2.5; 2.5; 2.5 MG/1; MG/1; MG/1; MG/1
10 TABLET ORAL 3 TIMES DAILY
Qty: 90 TABLET | Refills: 0 | Status: SHIPPED | OUTPATIENT
Start: 2024-10-04 | End: 2024-11-03

## 2024-08-06 RX ORDER — DEXTROAMPHETAMINE SACCHARATE, AMPHETAMINE ASPARTATE, DEXTROAMPHETAMINE SULFATE AND AMPHETAMINE SULFATE 2.5; 2.5; 2.5; 2.5 MG/1; MG/1; MG/1; MG/1
10 TABLET ORAL 3 TIMES DAILY
Qty: 90 TABLET | Refills: 0 | Status: SHIPPED | OUTPATIENT
Start: 2024-08-06 | End: 2024-09-05

## 2024-08-06 RX ORDER — DEXTROAMPHETAMINE SACCHARATE, AMPHETAMINE ASPARTATE, DEXTROAMPHETAMINE SULFATE AND AMPHETAMINE SULFATE 2.5; 2.5; 2.5; 2.5 MG/1; MG/1; MG/1; MG/1
10 TABLET ORAL 3 TIMES DAILY
Qty: 90 TABLET | Refills: 0 | Status: SHIPPED | OUTPATIENT
Start: 2024-09-05 | End: 2024-10-05

## 2024-08-06 ASSESSMENT — PATIENT HEALTH QUESTIONNAIRE - PHQ9
2. FEELING DOWN, DEPRESSED OR HOPELESS: NOT AT ALL
1. LITTLE INTEREST OR PLEASURE IN DOING THINGS: NOT AT ALL
SUM OF ALL RESPONSES TO PHQ9 QUESTIONS 1 & 2: 0

## 2024-08-06 ASSESSMENT — ENCOUNTER SYMPTOMS
DECREASED CONCENTRATION: 1
SLEEP DISTURBANCE: 1
NERVOUS/ANXIOUS: 1

## 2024-08-06 NOTE — PROGRESS NOTES
"Ashley PINEDA Laith  69 y.o.       SUBJECTIVE    HPI   Ashley is a 69-year-old young lady who was seen today for follow-up of encephalopathy due to attention deficit disorder with difficulty at work at home.  Since last seen she is doing very well on Adderall and guanfacine.  Today her physical and neurological examination was normal.  I would like to continue her medication the way she is taking and have discussed the controlled substance policy, abuse potential, risk benefit and the precautions to be taken and depending on how she does I might make future recommendation when she comes back to see me in 3 months.    I did review the medication list.      Due to technical limitations of voice recognition and human error, this note may not accurately reflect the care of the patient.    Review of Systems   Psychiatric/Behavioral:  Positive for decreased concentration and sleep disturbance. The patient is nervous/anxious.         Patient Active Problem List   Diagnosis    ADD (attention deficit disorder)    Encephalopathy    Insomnia due to medical condition    Anemia    HTN (hypertension)    Hypertriglyceridemia    Hypothyroid    Iron deficiency anemia, unspecified    Vitamin D deficiency disease     Past Medical History:   Diagnosis Date    Personal history of other mental and behavioral disorders 01/10/2017    History of attention deficit hyperactivity disorder (ADHD)     No past surgical history on file.    reports that she has quit smoking. Her smoking use included cigarettes. She has never used smokeless tobacco. She reports that she does not drink alcohol and does not use drugs.    /83 (BP Location: Left arm, Patient Position: Sitting, BP Cuff Size: Adult)   Pulse 95   Ht 1.676 m (5' 6\")   Wt 87.9 kg (193 lb 12.8 oz)   BMI 31.28 kg/m²     OBJECTIVE  Physical Exam/Neurological Exam   Constitutional: General appearance: no acute distress   Auscultation of Heart: Regular rate and rhythm, no murmurs, normal " S1 and S2.   Carotid Arteries: Intact without any bruits.   Neck is supple.   No lymph adenopathy.   Peripheral Vascular Exam: Pulses +2 and equal in all extremities. No swelling, varicosities, edema or tenderness to palpations.    Abdomen is soft, nondistended. No organomegaly.  Mental status: The patient was in no distress, alert, interactive and cooperative. Affect is appropriate.   Orientation: oriented to person, oriented to place and oriented to time.   Memory: recent memory intact and remote memory intact.   Attention: normal attention span and normal concentrating ability.   Language: normal comprehension and no difficulty naming common objects.   Fund of knowledge: Patient displays adequate knowledge of current events, adequate fund of knowledge regarding past history and adequate fund of knowledge regarding vocabulary.   Eyes: The ophthalmoscopic examination was normal. The fundi are visualized with normal disc margins and without.  Cranial nerve II: Visual fields full to confrontation.   Cranial nerves III, IV, and VI: Pupils round, equally reactive to light; no ptosis. EOMs intact. No nystagmus.   Cranial Nerve V: Facial sensation intact bilaterally.   Cranial nerve VII: Normal and symmetric facial strength.   Cranial nerve VIII: Hearing is intact bilaterally to finger rub / whisper.   Cranial nerves IX and X: Palate elevates symmetrically.   Cranial nerve XI: Shoulder shrug and neck rotation strength are intact.   Cranial nerve XII: Tongue midline with normal strength.   Motor: Motor exam was normal. Muscle bulk was normal in both upper and lower extremities. Muscle tone was normal in both upper and lower extremities. Muscle strength was 5/5 throughout. no abnormal or adventitious movements were present.   Deep Tendon Reflexes: left biceps 2+ , right biceps 2+, left triceps 2+, right triceps 2+, left brachioradialis 2+, right brachioradialis 2+, left patella 2+, right patella 2+, left ankle jerk 2+,  right ankle jerk 2+   Plantar Reflex: Toes downgoing to plantar stimulation on the left. Toes downgoing to plantar stimulation on the right.   Sensory Exam: Normal to light touch.   Coordination: There is no limb dystaxia and rapid alternating movements are intact.  Gait: Gait is normal without spasticity, ataxia or bradykinesia. Stance is stable with a negative Romberg.      ASSESSMENT/PLAN  Diagnoses and all orders for this visit:  Encephalopathy  ADD (attention deficit disorder) without hyperactivity  -     amphetamine-dextroamphetamine (Adderall) 10 mg tablet; Take 1 tablet (10 mg) by mouth 3 times a day.  -     amphetamine-dextroamphetamine (Adderall) 10 mg tablet; Take 1 tablet (10 mg) by mouth 3 times a day. Do not fill before September 5, 2024.  -     amphetamine-dextroamphetamine (Adderall) 10 mg tablet; Take 1 tablet (10 mg) by mouth 3 times a day. Do not fill before October 4, 2024.  -     guanFACINE (Intuniv) 2 mg ER 24 hr tablet; Take 1 tablet (2 mg) by mouth once daily.  Vitamin D deficiency disease  Insomnia due to medical condition        Marc Johnson MD  8/6/2024  1:42 PM

## 2024-08-19 ENCOUNTER — TELEPHONE (OUTPATIENT)
Dept: NEUROLOGY | Facility: CLINIC | Age: 69
End: 2024-08-19
Payer: MEDICARE

## 2024-09-19 ENCOUNTER — TELEPHONE (OUTPATIENT)
Dept: NEUROLOGY | Facility: CLINIC | Age: 69
End: 2024-09-19
Payer: MEDICARE

## 2024-09-19 DIAGNOSIS — F98.8 ADD (ATTENTION DEFICIT DISORDER) WITHOUT HYPERACTIVITY: ICD-10-CM

## 2024-09-20 RX ORDER — GUANFACINE 2 MG/1
2 TABLET, EXTENDED RELEASE ORAL DAILY
Qty: 30 TABLET | Refills: 3 | Status: SHIPPED | OUTPATIENT
Start: 2024-09-20

## 2024-10-29 ENCOUNTER — APPOINTMENT (OUTPATIENT)
Dept: NEUROLOGY | Facility: CLINIC | Age: 69
End: 2024-10-29
Payer: MEDICARE

## 2024-10-29 VITALS
HEART RATE: 112 BPM | HEIGHT: 67 IN | SYSTOLIC BLOOD PRESSURE: 126 MMHG | DIASTOLIC BLOOD PRESSURE: 84 MMHG | WEIGHT: 189.2 LBS | BODY MASS INDEX: 29.7 KG/M2

## 2024-10-29 DIAGNOSIS — G47.01 INSOMNIA DUE TO MEDICAL CONDITION: ICD-10-CM

## 2024-10-29 DIAGNOSIS — F98.8 ADD (ATTENTION DEFICIT DISORDER) WITHOUT HYPERACTIVITY: ICD-10-CM

## 2024-10-29 DIAGNOSIS — G93.40 ENCEPHALOPATHY, UNSPECIFIED TYPE: Primary | ICD-10-CM

## 2024-10-29 DIAGNOSIS — E55.9 VITAMIN D DEFICIENCY DISEASE: ICD-10-CM

## 2024-10-29 PROCEDURE — 1160F RVW MEDS BY RX/DR IN RCRD: CPT | Performed by: PSYCHIATRY & NEUROLOGY

## 2024-10-29 PROCEDURE — 1159F MED LIST DOCD IN RCRD: CPT | Performed by: PSYCHIATRY & NEUROLOGY

## 2024-10-29 PROCEDURE — 3008F BODY MASS INDEX DOCD: CPT | Performed by: PSYCHIATRY & NEUROLOGY

## 2024-10-29 PROCEDURE — 1036F TOBACCO NON-USER: CPT | Performed by: PSYCHIATRY & NEUROLOGY

## 2024-10-29 PROCEDURE — 99214 OFFICE O/P EST MOD 30 MIN: CPT | Performed by: PSYCHIATRY & NEUROLOGY

## 2024-10-29 PROCEDURE — 3074F SYST BP LT 130 MM HG: CPT | Performed by: PSYCHIATRY & NEUROLOGY

## 2024-10-29 PROCEDURE — 3079F DIAST BP 80-89 MM HG: CPT | Performed by: PSYCHIATRY & NEUROLOGY

## 2024-10-29 RX ORDER — DEXTROAMPHETAMINE SACCHARATE, AMPHETAMINE ASPARTATE, DEXTROAMPHETAMINE SULFATE AND AMPHETAMINE SULFATE 2.5; 2.5; 2.5; 2.5 MG/1; MG/1; MG/1; MG/1
10 TABLET ORAL 3 TIMES DAILY
Qty: 90 TABLET | Refills: 0 | Status: SHIPPED | OUTPATIENT
Start: 2024-12-27 | End: 2025-01-26

## 2024-10-29 RX ORDER — GUANFACINE 2 MG/1
2 TABLET, EXTENDED RELEASE ORAL DAILY
Qty: 30 TABLET | Refills: 3 | Status: SHIPPED | OUTPATIENT
Start: 2024-10-29

## 2024-10-29 RX ORDER — DEXTROAMPHETAMINE SACCHARATE, AMPHETAMINE ASPARTATE, DEXTROAMPHETAMINE SULFATE AND AMPHETAMINE SULFATE 2.5; 2.5; 2.5; 2.5 MG/1; MG/1; MG/1; MG/1
10 TABLET ORAL 3 TIMES DAILY
Qty: 90 TABLET | Refills: 0 | Status: SHIPPED | OUTPATIENT
Start: 2024-11-28 | End: 2024-12-28

## 2024-10-29 RX ORDER — DEXTROAMPHETAMINE SACCHARATE, AMPHETAMINE ASPARTATE, DEXTROAMPHETAMINE SULFATE AND AMPHETAMINE SULFATE 2.5; 2.5; 2.5; 2.5 MG/1; MG/1; MG/1; MG/1
10 TABLET ORAL 3 TIMES DAILY
Qty: 90 TABLET | Refills: 0 | Status: SHIPPED | OUTPATIENT
Start: 2024-10-29 | End: 2024-11-28

## 2024-10-29 ASSESSMENT — ENCOUNTER SYMPTOMS
NUMBNESS: 0
COUGH: 0
ABDOMINAL PAIN: 0
BACK PAIN: 0
NECK STIFFNESS: 0
FATIGUE: 0
VOMITING: 0
HEADACHES: 0
NERVOUS/ANXIOUS: 1
NECK PAIN: 0
FACIAL ASYMMETRY: 0
PALPITATIONS: 0
UNEXPECTED WEIGHT CHANGE: 0
ADENOPATHY: 0
AGITATION: 0
TREMORS: 0
JOINT SWELLING: 0
DIFFICULTY URINATING: 0
NAUSEA: 0
FEVER: 0
SLEEP DISTURBANCE: 1
FREQUENCY: 0
SPEECH DIFFICULTY: 0
HALLUCINATIONS: 0
ARTHRALGIAS: 0
EYE PAIN: 0
SHORTNESS OF BREATH: 0
BRUISES/BLEEDS EASILY: 0
WEAKNESS: 0
SEIZURES: 0
HYPERACTIVE: 0
DIZZINESS: 0
SINUS PRESSURE: 0
CONFUSION: 0
LIGHT-HEADEDNESS: 0
DECREASED CONCENTRATION: 1
WHEEZING: 0
TROUBLE SWALLOWING: 0
PHOTOPHOBIA: 0

## 2025-01-22 ENCOUNTER — APPOINTMENT (OUTPATIENT)
Dept: NEUROLOGY | Facility: CLINIC | Age: 70
End: 2025-01-22
Payer: MEDICARE

## 2025-01-22 VITALS
HEIGHT: 67 IN | DIASTOLIC BLOOD PRESSURE: 72 MMHG | SYSTOLIC BLOOD PRESSURE: 138 MMHG | HEART RATE: 104 BPM | BODY MASS INDEX: 29.82 KG/M2 | WEIGHT: 190 LBS

## 2025-01-22 DIAGNOSIS — G93.40 ENCEPHALOPATHY, UNSPECIFIED TYPE: Primary | ICD-10-CM

## 2025-01-22 DIAGNOSIS — Z51.81 ENCOUNTER FOR THERAPEUTIC DRUG LEVEL MONITORING: ICD-10-CM

## 2025-01-22 DIAGNOSIS — F98.8 ADD (ATTENTION DEFICIT DISORDER) WITHOUT HYPERACTIVITY: ICD-10-CM

## 2025-01-22 LAB
AMPHETAMINES UR QL SCN: ABNORMAL
BARBITURATES UR QL SCN: ABNORMAL
BENZODIAZ UR QL SCN: ABNORMAL
BZE UR QL SCN: ABNORMAL
CANNABINOIDS UR QL SCN: ABNORMAL
FENTANYL+NORFENTANYL UR QL SCN: ABNORMAL
METHADONE UR QL SCN: ABNORMAL
OPIATES UR QL SCN: ABNORMAL
OXYCODONE+OXYMORPHONE UR QL SCN: ABNORMAL
PCP UR QL SCN: ABNORMAL

## 2025-01-22 PROCEDURE — 80359 METHYLENEDIOXYAMPHETAMINES: CPT

## 2025-01-22 PROCEDURE — 1036F TOBACCO NON-USER: CPT | Performed by: PSYCHIATRY & NEUROLOGY

## 2025-01-22 PROCEDURE — 3008F BODY MASS INDEX DOCD: CPT | Performed by: PSYCHIATRY & NEUROLOGY

## 2025-01-22 PROCEDURE — 99214 OFFICE O/P EST MOD 30 MIN: CPT | Performed by: PSYCHIATRY & NEUROLOGY

## 2025-01-22 PROCEDURE — 1160F RVW MEDS BY RX/DR IN RCRD: CPT | Performed by: PSYCHIATRY & NEUROLOGY

## 2025-01-22 PROCEDURE — 1159F MED LIST DOCD IN RCRD: CPT | Performed by: PSYCHIATRY & NEUROLOGY

## 2025-01-22 PROCEDURE — 3078F DIAST BP <80 MM HG: CPT | Performed by: PSYCHIATRY & NEUROLOGY

## 2025-01-22 PROCEDURE — 3075F SYST BP GE 130 - 139MM HG: CPT | Performed by: PSYCHIATRY & NEUROLOGY

## 2025-01-22 RX ORDER — DEXTROAMPHETAMINE SACCHARATE, AMPHETAMINE ASPARTATE, DEXTROAMPHETAMINE SULFATE AND AMPHETAMINE SULFATE 2.5; 2.5; 2.5; 2.5 MG/1; MG/1; MG/1; MG/1
10 TABLET ORAL 3 TIMES DAILY
Qty: 90 TABLET | Refills: 0 | Status: SHIPPED | OUTPATIENT
Start: 2025-01-22 | End: 2025-02-21

## 2025-01-22 RX ORDER — GUANFACINE 3 MG/1
3 TABLET, EXTENDED RELEASE ORAL DAILY
Qty: 30 TABLET | Refills: 3 | Status: SHIPPED | OUTPATIENT
Start: 2025-01-22

## 2025-01-22 RX ORDER — DEXTROAMPHETAMINE SACCHARATE, AMPHETAMINE ASPARTATE, DEXTROAMPHETAMINE SULFATE AND AMPHETAMINE SULFATE 2.5; 2.5; 2.5; 2.5 MG/1; MG/1; MG/1; MG/1
10 TABLET ORAL 3 TIMES DAILY
Qty: 90 TABLET | Refills: 0 | Status: SHIPPED | OUTPATIENT
Start: 2025-02-21 | End: 2025-03-23

## 2025-01-22 RX ORDER — DEXTROAMPHETAMINE SACCHARATE, AMPHETAMINE ASPARTATE, DEXTROAMPHETAMINE SULFATE AND AMPHETAMINE SULFATE 2.5; 2.5; 2.5; 2.5 MG/1; MG/1; MG/1; MG/1
10 TABLET ORAL 3 TIMES DAILY
Qty: 90 TABLET | Refills: 0 | Status: SHIPPED | OUTPATIENT
Start: 2025-03-21 | End: 2025-04-20

## 2025-01-22 ASSESSMENT — ENCOUNTER SYMPTOMS
NECK STIFFNESS: 0
HYPERACTIVE: 0
BRUISES/BLEEDS EASILY: 0
AGITATION: 0
CONFUSION: 0
FACIAL ASYMMETRY: 0
SPEECH DIFFICULTY: 0
UNEXPECTED WEIGHT CHANGE: 0
LIGHT-HEADEDNESS: 0
NUMBNESS: 0
SHORTNESS OF BREATH: 0
ADENOPATHY: 0
NAUSEA: 0
COUGH: 0
FREQUENCY: 0
ABDOMINAL PAIN: 0
TREMORS: 0
SEIZURES: 0
WHEEZING: 0
JOINT SWELLING: 0
HALLUCINATIONS: 0
FATIGUE: 0
PALPITATIONS: 0
SINUS PRESSURE: 0
BACK PAIN: 0
NERVOUS/ANXIOUS: 1
HEADACHES: 0
PHOTOPHOBIA: 0
TROUBLE SWALLOWING: 0
DECREASED CONCENTRATION: 1
DIZZINESS: 0
SLEEP DISTURBANCE: 1
WEAKNESS: 0
EYE PAIN: 0
DIFFICULTY URINATING: 0
VOMITING: 0
NECK PAIN: 0
ARTHRALGIAS: 0
FEVER: 0

## 2025-01-22 NOTE — PROGRESS NOTES
Ashley PINEDA Laith  69 y.o.       SUBJECTIVE  Ashley is a 69-year-old young lady who was seen today for follow-up of her encephalopathy.  Attention deficit disorder with difficulty at work at home.  Since last seen she can tell a big difference when she is on medicine compared off medicine.  She is doing well with Intuniv and would like to increase the dose.  No side effects from the medication.  Today her physical and neurological examination was normal.  I would like to keep her Adderall the way she is taking but increase her Intuniv ER to 3 mg daily and have discussed the controlled substance policy, abuse potential, risk benefit and the precautions to be taken and depending on how she does I might make future recommendation when she comes back to see me in 3 months    I did review the medication list.      Due to technical limitations of voice recognition and human error, this note may not accurately reflect the care of the patient.    Review of Systems   Constitutional:  Negative for fatigue, fever and unexpected weight change.   HENT:  Negative for dental problem, ear pain, hearing loss, sinus pressure, tinnitus and trouble swallowing.    Eyes:  Negative for photophobia, pain and visual disturbance.   Respiratory:  Negative for cough, shortness of breath and wheezing.    Cardiovascular:  Negative for chest pain, palpitations and leg swelling.   Gastrointestinal:  Negative for abdominal pain, nausea and vomiting.   Genitourinary:  Negative for difficulty urinating, enuresis and frequency.   Musculoskeletal:  Negative for arthralgias, back pain, joint swelling, neck pain and neck stiffness.   Skin:  Negative for pallor and rash.   Allergic/Immunologic: Negative for food allergies.   Neurological:  Negative for dizziness, tremors, seizures, syncope, facial asymmetry, speech difficulty, weakness, light-headedness, numbness and headaches.   Hematological:  Negative for adenopathy. Does not bruise/bleed easily.  "  Psychiatric/Behavioral:  Positive for decreased concentration and sleep disturbance. Negative for agitation, behavioral problems, confusion and hallucinations. The patient is nervous/anxious. The patient is not hyperactive.         Patient Active Problem List   Diagnosis    ADD (attention deficit disorder)    Encephalopathy    Insomnia due to medical condition    Anemia    HTN (hypertension)    Hypertriglyceridemia    Hypothyroid    Iron deficiency anemia, unspecified    Vitamin D deficiency disease     Past Medical History:   Diagnosis Date    Personal history of other mental and behavioral disorders 01/10/2017    History of attention deficit hyperactivity disorder (ADHD)     History reviewed. No pertinent surgical history.    reports that she has quit smoking. Her smoking use included cigarettes. She has never used smokeless tobacco. She reports that she does not drink alcohol and does not use drugs.    /72 (BP Location: Left arm, Patient Position: Sitting, BP Cuff Size: Small adult)   Pulse 104   Ht 1.689 m (5' 6.5\")   Wt 86.2 kg (190 lb)   BMI 30.21 kg/m²     OBJECTIVE  Physical Exam/Neurological Exam   Constitutional: General appearance: no acute distress   Auscultation of Heart: Regular rate and rhythm, no murmurs, normal S1 and S2.   Carotid Arteries: Intact without any bruits.   Neck is supple.   No lymph adenopathy.   Peripheral Vascular Exam: Pulses +2 and equal in all extremities. No swelling, varicosities, edema or tenderness to palpations.    Abdomen is soft, nondistended. No organomegaly.  Mental status: The patient was in no distress, alert, interactive and cooperative. Affect is appropriate.   Orientation: oriented to person, oriented to place and oriented to time.   Memory: recent memory intact and remote memory intact.   Attention: normal attention span and normal concentrating ability.   Language: normal comprehension and no difficulty naming common objects.   Fund of knowledge: " Patient displays adequate knowledge of current events, adequate fund of knowledge regarding past history and adequate fund of knowledge regarding vocabulary.   Eyes: The ophthalmoscopic examination was normal. The fundi are visualized with normal disc margins and without.  Cranial nerve II: Visual fields full to confrontation.   Cranial nerves III, IV, and VI: Pupils round, equally reactive to light; no ptosis. EOMs intact. No nystagmus.   Cranial Nerve V: Facial sensation intact bilaterally.   Cranial nerve VII: Normal and symmetric facial strength.   Cranial nerve VIII: Hearing is intact bilaterally to finger rub / whisper.   Cranial nerves IX and X: Palate elevates symmetrically.   Cranial nerve XI: Shoulder shrug and neck rotation strength are intact.   Cranial nerve XII: Tongue midline with normal strength.   Motor: Motor exam was normal. Muscle bulk was normal in both upper and lower extremities. Muscle tone was normal in both upper and lower extremities. Muscle strength was 5/5 throughout. no abnormal or adventitious movements were present.   Deep Tendon Reflexes: left biceps 2+ , right biceps 2+, left triceps 2+, right triceps 2+, left brachioradialis 2+, right brachioradialis 2+, left patella 2+, right patella 2+, left ankle jerk 2+, right ankle jerk 2+   Plantar Reflex: Toes downgoing to plantar stimulation on the left. Toes downgoing to plantar stimulation on the right.   Sensory Exam: Normal to light touch.   Coordination: There is no limb dystaxia and rapid alternating movements are intact.  Gait: Gait is normal without spasticity, ataxia or bradykinesia. Stance is stable with a negative Romberg.      ASSESSMENT/PLAN  Diagnoses and all orders for this visit:  Encephalopathy, unspecified type  ADD (attention deficit disorder) without hyperactivity  -     Drug Screen, Urine With Reflex to Confirmation  -     amphetamine-dextroamphetamine (Adderall) 10 mg tablet; Take 1 tablet (10 mg) by mouth 3 times a  day.  -     amphetamine-dextroamphetamine (Adderall) 10 mg tablet; Take 1 tablet (10 mg) by mouth 3 times a day. Do not fill before February 21, 2025.  -     amphetamine-dextroamphetamine (Adderall) 10 mg tablet; Take 1 tablet (10 mg) by mouth 3 times a day. Do not fill before March 21, 2025.  -     guanFACINE (Intuniv) 3 mg ER 24 hr tablet; Take 1 tablet (3 mg) by mouth once daily.  Encounter for therapeutic drug level monitoring  -     Drug Screen, Urine With Reflex to Confirmation        Marc Johnson MD  1/22/2025  1:21 PM

## 2025-01-27 LAB
AMPHET UR-MCNC: 1395 NG/ML
MDA UR-MCNC: <200 NG/ML
MDEA UR-MCNC: <200 NG/ML
MDMA UR-MCNC: <200 NG/ML
METHAMPHET UR-MCNC: <200 NG/ML
PHENTERMINE UR CFM-MCNC: <200 NG/ML

## 2025-04-16 ENCOUNTER — APPOINTMENT (OUTPATIENT)
Dept: NEUROLOGY | Facility: CLINIC | Age: 70
End: 2025-04-16
Payer: MEDICARE

## 2025-04-16 VITALS
BODY MASS INDEX: 31.5 KG/M2 | DIASTOLIC BLOOD PRESSURE: 80 MMHG | WEIGHT: 196 LBS | SYSTOLIC BLOOD PRESSURE: 138 MMHG | HEIGHT: 66 IN | HEART RATE: 94 BPM

## 2025-04-16 DIAGNOSIS — G93.40 ENCEPHALOPATHY, UNSPECIFIED TYPE: Primary | ICD-10-CM

## 2025-04-16 DIAGNOSIS — E55.9 VITAMIN D DEFICIENCY DISEASE: ICD-10-CM

## 2025-04-16 DIAGNOSIS — F98.8 ADD (ATTENTION DEFICIT DISORDER) WITHOUT HYPERACTIVITY: ICD-10-CM

## 2025-04-16 DIAGNOSIS — G47.01 INSOMNIA DUE TO MEDICAL CONDITION: ICD-10-CM

## 2025-04-16 PROCEDURE — 3008F BODY MASS INDEX DOCD: CPT | Performed by: PSYCHIATRY & NEUROLOGY

## 2025-04-16 PROCEDURE — 3075F SYST BP GE 130 - 139MM HG: CPT | Performed by: PSYCHIATRY & NEUROLOGY

## 2025-04-16 PROCEDURE — 1036F TOBACCO NON-USER: CPT | Performed by: PSYCHIATRY & NEUROLOGY

## 2025-04-16 PROCEDURE — 1159F MED LIST DOCD IN RCRD: CPT | Performed by: PSYCHIATRY & NEUROLOGY

## 2025-04-16 PROCEDURE — 99214 OFFICE O/P EST MOD 30 MIN: CPT | Performed by: PSYCHIATRY & NEUROLOGY

## 2025-04-16 PROCEDURE — 1160F RVW MEDS BY RX/DR IN RCRD: CPT | Performed by: PSYCHIATRY & NEUROLOGY

## 2025-04-16 PROCEDURE — 3079F DIAST BP 80-89 MM HG: CPT | Performed by: PSYCHIATRY & NEUROLOGY

## 2025-04-16 RX ORDER — DEXTROAMPHETAMINE SACCHARATE, AMPHETAMINE ASPARTATE, DEXTROAMPHETAMINE SULFATE AND AMPHETAMINE SULFATE 2.5; 2.5; 2.5; 2.5 MG/1; MG/1; MG/1; MG/1
10 TABLET ORAL 3 TIMES DAILY
Qty: 90 TABLET | Refills: 0 | Status: SHIPPED | OUTPATIENT
Start: 2025-04-16 | End: 2025-05-16

## 2025-04-16 RX ORDER — DEXTROAMPHETAMINE SACCHARATE, AMPHETAMINE ASPARTATE, DEXTROAMPHETAMINE SULFATE AND AMPHETAMINE SULFATE 2.5; 2.5; 2.5; 2.5 MG/1; MG/1; MG/1; MG/1
10 TABLET ORAL 3 TIMES DAILY
Qty: 90 TABLET | Refills: 0 | Status: SHIPPED | OUTPATIENT
Start: 2025-06-15 | End: 2025-07-15

## 2025-04-16 RX ORDER — GUANFACINE 3 MG/1
3 TABLET, EXTENDED RELEASE ORAL DAILY
Qty: 30 TABLET | Refills: 3 | Status: SHIPPED | OUTPATIENT
Start: 2025-04-16

## 2025-04-16 RX ORDER — DEXTROAMPHETAMINE SACCHARATE, AMPHETAMINE ASPARTATE, DEXTROAMPHETAMINE SULFATE AND AMPHETAMINE SULFATE 2.5; 2.5; 2.5; 2.5 MG/1; MG/1; MG/1; MG/1
10 TABLET ORAL 3 TIMES DAILY
Qty: 90 TABLET | Refills: 0 | Status: SHIPPED | OUTPATIENT
Start: 2025-05-15 | End: 2025-06-14

## 2025-04-16 ASSESSMENT — ENCOUNTER SYMPTOMS
DECREASED CONCENTRATION: 1
LIGHT-HEADEDNESS: 1
DYSPHORIC MOOD: 1

## 2025-04-16 ASSESSMENT — PATIENT HEALTH QUESTIONNAIRE - PHQ9
2. FEELING DOWN, DEPRESSED OR HOPELESS: NOT AT ALL
SUM OF ALL RESPONSES TO PHQ9 QUESTIONS 1 & 2: 0
1. LITTLE INTEREST OR PLEASURE IN DOING THINGS: NOT AT ALL

## 2025-04-16 NOTE — PROGRESS NOTES
"Ashley PINEDA Laith  69 y.o.       SUBJECTIVE  Ashley is a 69-year-old young lady who was seen today for follow-up of her encephalopathy due to attention deficit disorder difficulty at work and at home.  Since last seen she is doing very well on Adderall and Intuniv and can tell a big difference when she is on medicine compared to off medicine.  No side effects from the medication.  Today her physical and neurological examination was normal.  I would like to continue her medication the way she is taking and have discussed the controlled substance policy, abuse potential, risk benefit and the precautions to be taken and depending on how she does I might make future recommendation when she comes back to see me in 3 months.    I did review the medication list.      Due to technical limitations of voice recognition and human error, this note may not accurately reflect the care of the patient.    Review of Systems   Neurological:  Positive for light-headedness.   Psychiatric/Behavioral:  Positive for decreased concentration and dysphoric mood.         Problem List[1]  Medical History[2]  Surgical History[3]    reports that she has quit smoking. Her smoking use included cigarettes. She has never used smokeless tobacco. She reports that she does not drink alcohol and does not use drugs.    /80 (BP Location: Left arm, Patient Position: Sitting, BP Cuff Size: Small adult)   Pulse 94   Ht 1.676 m (5' 6\")   Wt 88.9 kg (196 lb)   BMI 31.64 kg/m²     OBJECTIVE  Physical Exam/Neurological Exam   Constitutional: General appearance: no acute distress   Auscultation of Heart: Regular rate and rhythm, no murmurs, normal S1 and S2.   Carotid Arteries: Intact without any bruits.   Neck is supple.   No lymph adenopathy.   Peripheral Vascular Exam: Pulses +2 and equal in all extremities. No swelling, varicosities, edema or tenderness to palpations.    Abdomen is soft, nondistended. No organomegaly.  Mental status: The patient was " in no distress, alert, interactive and cooperative. Affect is appropriate.   Orientation: oriented to person, oriented to place and oriented to time.   Memory: recent memory intact and remote memory intact.   Attention: normal attention span and normal concentrating ability.   Language: normal comprehension and no difficulty naming common objects.   Fund of knowledge: Patient displays adequate knowledge of current events, adequate fund of knowledge regarding past history and adequate fund of knowledge regarding vocabulary.   Eyes: The ophthalmoscopic examination was normal. The fundi are visualized with normal disc margins and without.  Cranial nerve II: Visual fields full to confrontation.   Cranial nerves III, IV, and VI: Pupils round, equally reactive to light; no ptosis. EOMs intact. No nystagmus.   Cranial Nerve V: Facial sensation intact bilaterally.   Cranial nerve VII: Normal and symmetric facial strength.   Cranial nerve VIII: Hearing is intact bilaterally to finger rub / whisper.   Cranial nerves IX and X: Palate elevates symmetrically.   Cranial nerve XI: Shoulder shrug and neck rotation strength are intact.   Cranial nerve XII: Tongue midline with normal strength.   Motor: Motor exam was normal. Muscle bulk was normal in both upper and lower extremities. Muscle tone was normal in both upper and lower extremities. Muscle strength was 5/5 throughout. no abnormal or adventitious movements were present.   Deep Tendon Reflexes: left biceps 2+ , right biceps 2+, left triceps 2+, right triceps 2+, left brachioradialis 2+, right brachioradialis 2+, left patella 2+, right patella 2+, left ankle jerk 2+, right ankle jerk 2+   Plantar Reflex: Toes downgoing to plantar stimulation on the left. Toes downgoing to plantar stimulation on the right.   Sensory Exam: Normal to light touch.   Coordination: There is no limb dystaxia and rapid alternating movements are intact.  Gait: Gait is normal without spasticity, ataxia  or bradykinesia. Stance is stable with a negative Romberg.      ASSESSMENT/PLAN  Diagnoses and all orders for this visit:  Encephalopathy, unspecified type  ADD (attention deficit disorder) without hyperactivity  -     amphetamine-dextroamphetamine (Adderall) 10 mg tablet; Take 1 tablet (10 mg) by mouth 3 times a day.  -     amphetamine-dextroamphetamine (Adderall) 10 mg tablet; Take 1 tablet (10 mg) by mouth 3 times a day. Do not fill before May 15, 2025.  -     amphetamine-dextroamphetamine (Adderall) 10 mg tablet; Take 1 tablet (10 mg) by mouth 3 times a day. Do not fill before Emelyn 15, 2025.  -     guanFACINE (Intuniv) 3 mg ER 24 hr tablet; Take 1 tablet (3 mg) by mouth once daily.  Insomnia due to medical condition  Vitamin D deficiency disease        Marc Johnson MD  4/16/2025  1:33 PM       [1]   Patient Active Problem List  Diagnosis    ADD (attention deficit disorder)    Encephalopathy    Insomnia due to medical condition    Anemia    HTN (hypertension)    Hypertriglyceridemia    Hypothyroid    Iron deficiency anemia, unspecified    Vitamin D deficiency disease   [2]   Past Medical History:  Diagnosis Date    Personal history of other mental and behavioral disorders 01/10/2017    History of attention deficit hyperactivity disorder (ADHD)   [3] History reviewed. No pertinent surgical history.

## 2025-07-23 ENCOUNTER — APPOINTMENT (OUTPATIENT)
Dept: NEUROLOGY | Facility: CLINIC | Age: 70
End: 2025-07-23
Payer: MEDICARE

## 2025-07-23 VITALS
SYSTOLIC BLOOD PRESSURE: 130 MMHG | DIASTOLIC BLOOD PRESSURE: 84 MMHG | HEART RATE: 84 BPM | BODY MASS INDEX: 27.62 KG/M2 | WEIGHT: 176 LBS | HEIGHT: 67 IN

## 2025-07-23 DIAGNOSIS — G93.40 ENCEPHALOPATHY, UNSPECIFIED TYPE: Primary | ICD-10-CM

## 2025-07-23 DIAGNOSIS — F98.8 ADD (ATTENTION DEFICIT DISORDER) WITHOUT HYPERACTIVITY: ICD-10-CM

## 2025-07-23 DIAGNOSIS — G47.01 INSOMNIA DUE TO MEDICAL CONDITION: ICD-10-CM

## 2025-07-23 DIAGNOSIS — E55.9 VITAMIN D DEFICIENCY DISEASE: ICD-10-CM

## 2025-07-23 PROCEDURE — 1159F MED LIST DOCD IN RCRD: CPT | Performed by: PSYCHIATRY & NEUROLOGY

## 2025-07-23 PROCEDURE — 99214 OFFICE O/P EST MOD 30 MIN: CPT | Performed by: PSYCHIATRY & NEUROLOGY

## 2025-07-23 PROCEDURE — 3075F SYST BP GE 130 - 139MM HG: CPT | Performed by: PSYCHIATRY & NEUROLOGY

## 2025-07-23 PROCEDURE — 1160F RVW MEDS BY RX/DR IN RCRD: CPT | Performed by: PSYCHIATRY & NEUROLOGY

## 2025-07-23 PROCEDURE — 3008F BODY MASS INDEX DOCD: CPT | Performed by: PSYCHIATRY & NEUROLOGY

## 2025-07-23 PROCEDURE — 3079F DIAST BP 80-89 MM HG: CPT | Performed by: PSYCHIATRY & NEUROLOGY

## 2025-07-23 RX ORDER — GUANFACINE 3 MG/1
3 TABLET, EXTENDED RELEASE ORAL DAILY
Qty: 30 TABLET | Refills: 3 | Status: SHIPPED | OUTPATIENT
Start: 2025-07-23

## 2025-07-23 RX ORDER — DEXTROAMPHETAMINE SACCHARATE, AMPHETAMINE ASPARTATE, DEXTROAMPHETAMINE SULFATE AND AMPHETAMINE SULFATE 2.5; 2.5; 2.5; 2.5 MG/1; MG/1; MG/1; MG/1
10 TABLET ORAL 3 TIMES DAILY
Qty: 90 TABLET | Refills: 0 | Status: SHIPPED | OUTPATIENT
Start: 2025-09-21 | End: 2025-10-21

## 2025-07-23 RX ORDER — DEXTROAMPHETAMINE SACCHARATE, AMPHETAMINE ASPARTATE, DEXTROAMPHETAMINE SULFATE AND AMPHETAMINE SULFATE 2.5; 2.5; 2.5; 2.5 MG/1; MG/1; MG/1; MG/1
10 TABLET ORAL 3 TIMES DAILY
Qty: 90 TABLET | Refills: 0 | Status: SHIPPED | OUTPATIENT
Start: 2025-08-22 | End: 2025-09-21

## 2025-07-23 RX ORDER — DEXTROAMPHETAMINE SACCHARATE, AMPHETAMINE ASPARTATE, DEXTROAMPHETAMINE SULFATE AND AMPHETAMINE SULFATE 2.5; 2.5; 2.5; 2.5 MG/1; MG/1; MG/1; MG/1
10 TABLET ORAL 3 TIMES DAILY
Qty: 90 TABLET | Refills: 0 | Status: SHIPPED | OUTPATIENT
Start: 2025-07-23 | End: 2025-08-22

## 2025-07-23 ASSESSMENT — ENCOUNTER SYMPTOMS
UNEXPECTED WEIGHT CHANGE: 0
JOINT SWELLING: 0
SEIZURES: 0
VOMITING: 0
PALPITATIONS: 0
TROUBLE SWALLOWING: 0
WEAKNESS: 0
HALLUCINATIONS: 0
NUMBNESS: 0
DYSPHORIC MOOD: 1
FEVER: 0
LIGHT-HEADEDNESS: 0
NECK STIFFNESS: 0
PHOTOPHOBIA: 0
ADENOPATHY: 0
FACIAL ASYMMETRY: 0
SHORTNESS OF BREATH: 0
DECREASED CONCENTRATION: 1
NAUSEA: 0
DIZZINESS: 0
SPEECH DIFFICULTY: 0
FREQUENCY: 0
CONFUSION: 0
SLEEP DISTURBANCE: 0
EYE PAIN: 0
TREMORS: 0
AGITATION: 0
SINUS PRESSURE: 0
ARTHRALGIAS: 0
FATIGUE: 0
BRUISES/BLEEDS EASILY: 0
COUGH: 0
DIFFICULTY URINATING: 0
BACK PAIN: 0
NECK PAIN: 0
HEADACHES: 0
ABDOMINAL PAIN: 0
HYPERACTIVE: 0
WHEEZING: 0

## 2025-07-23 NOTE — PROGRESS NOTES
Ashley PINEDA Laith  70 y.o.       SUBJECTIVE  Ashley is a 70-year-old young girl who was seen today for follow-up of her encephalopathy due to attention deficit disorder difficulty at work at home.  Since last seen she has been doing well on Adderall 10 mg 3 times a day and after adding Intuniv she has felt a lot better.  No side effects of the medication.  Today her physical and neurological examination is normal.  I would like to continue her medicine the way she is taking and have discussed the controlled substance policy, abuse potential, risk benefit and the precautions to be taken and depending on how she does I might make future recommendation when she comes back to see me in 3 months.    I did review the medication list.      Due to technical limitations of voice recognition and human error, this note may not accurately reflect the care of the patient.    Review of Systems   Constitutional:  Negative for fatigue, fever and unexpected weight change.   HENT:  Negative for dental problem, ear pain, hearing loss, sinus pressure, tinnitus and trouble swallowing.    Eyes:  Negative for photophobia, pain and visual disturbance.   Respiratory:  Negative for cough, shortness of breath and wheezing.    Cardiovascular:  Negative for chest pain, palpitations and leg swelling.   Gastrointestinal:  Negative for abdominal pain, nausea and vomiting.   Genitourinary:  Negative for difficulty urinating, enuresis and frequency.   Musculoskeletal:  Negative for arthralgias, back pain, joint swelling, neck pain and neck stiffness.   Skin:  Negative for pallor and rash.   Allergic/Immunologic: Negative for food allergies.   Neurological:  Negative for dizziness, tremors, seizures, syncope, facial asymmetry, speech difficulty, weakness, light-headedness, numbness and headaches.   Hematological:  Negative for adenopathy. Does not bruise/bleed easily.   Psychiatric/Behavioral:  Positive for decreased concentration and dysphoric mood.  "Negative for agitation, behavioral problems, confusion, hallucinations and sleep disturbance. The patient is not hyperactive.         Problem List[1]  Medical History[2]  Surgical History[3]    reports that she has quit smoking. Her smoking use included cigarettes. She has been exposed to tobacco smoke. She has never used smokeless tobacco. She reports that she does not currently use alcohol. She reports that she does not currently use drugs.    /84 (BP Location: Right arm, Patient Position: Sitting, BP Cuff Size: Small adult)   Pulse 84   Ht 1.689 m (5' 6.5\")   Wt 79.8 kg (176 lb)   BMI 27.98 kg/m²     OBJECTIVE  Physical Exam/Neurological Exam   Constitutional: General appearance: no acute distress   Auscultation of Heart: Regular rate and rhythm, no murmurs, normal S1 and S2.   Carotid Arteries: Intact without any bruits.   Neck is supple.   No lymph adenopathy.   Peripheral Vascular Exam: Pulses +2 and equal in all extremities. No swelling, varicosities, edema or tenderness to palpations.    Abdomen is soft, nondistended. No organomegaly.  Mental status: The patient was in no distress, alert, interactive and cooperative. Affect is appropriate.   Orientation: oriented to person, oriented to place and oriented to time.   Memory: recent memory intact and remote memory intact.   Attention: normal attention span and normal concentrating ability.   Language: normal comprehension and no difficulty naming common objects.   Fund of knowledge: Patient displays adequate knowledge of current events, adequate fund of knowledge regarding past history and adequate fund of knowledge regarding vocabulary.   Eyes: The ophthalmoscopic examination was normal. The fundi are visualized with normal disc margins and without.  Cranial nerve II: Visual fields full to confrontation.   Cranial nerves III, IV, and VI: Pupils round, equally reactive to light; no ptosis. EOMs intact. No nystagmus.   Cranial Nerve V: Facial sensation " intact bilaterally.   Cranial nerve VII: Normal and symmetric facial strength.   Cranial nerve VIII: Hearing is intact bilaterally to finger rub / whisper.   Cranial nerves IX and X: Palate elevates symmetrically.   Cranial nerve XI: Shoulder shrug and neck rotation strength are intact.   Cranial nerve XII: Tongue midline with normal strength.   Motor: Motor exam was normal. Muscle bulk was normal in both upper and lower extremities. Muscle tone was normal in both upper and lower extremities. Muscle strength was 5/5 throughout. no abnormal or adventitious movements were present.   Deep Tendon Reflexes: left biceps 2+ , right biceps 2+, left triceps 2+, right triceps 2+, left brachioradialis 2+, right brachioradialis 2+, left patella 2+, right patella 2+, left ankle jerk 2+, right ankle jerk 2+   Plantar Reflex: Toes downgoing to plantar stimulation on the left. Toes downgoing to plantar stimulation on the right.   Sensory Exam: Normal to light touch.   Coordination: There is no limb dystaxia and rapid alternating movements are intact.  Gait: Gait is normal without spasticity, ataxia or bradykinesia. Stance is stable with a negative Romberg.      ASSESSMENT/PLAN  Diagnoses and all orders for this visit:  Encephalopathy, unspecified type  ADD (attention deficit disorder) without hyperactivity  -     amphetamine-dextroamphetamine (Adderall) 10 mg tablet; Take 1 tablet (10 mg) by mouth 3 times a day.  -     amphetamine-dextroamphetamine (Adderall) 10 mg tablet; Take 1 tablet (10 mg) by mouth 3 times a day. Do not fill before August 22, 2025.  -     amphetamine-dextroamphetamine (Adderall) 10 mg tablet; Take 1 tablet (10 mg) by mouth 3 times a day. Do not fill before September 21, 2025.  -     guanFACINE (Intuniv) 3 mg ER 24 hr tablet; Take 1 tablet (3 mg) by mouth once daily.  Insomnia due to medical condition  Vitamin D deficiency disease        Marc Johnson MD  7/23/2025  5:11 PM       [1]   Patient Active Problem  List  Diagnosis    ADD (attention deficit disorder)    Encephalopathy    Insomnia due to medical condition    Anemia    HTN (hypertension)    Hypertriglyceridemia    Hypothyroid    Iron deficiency anemia, unspecified    Vitamin D deficiency disease   [2]   Past Medical History:  Diagnosis Date    Personal history of other mental and behavioral disorders 01/10/2017    History of attention deficit hyperactivity disorder (ADHD)   [3] History reviewed. No pertinent surgical history.

## 2025-10-13 ENCOUNTER — APPOINTMENT (OUTPATIENT)
Dept: NEUROLOGY | Facility: CLINIC | Age: 70
End: 2025-10-13
Payer: MEDICARE